# Patient Record
Sex: MALE | Race: WHITE | NOT HISPANIC OR LATINO | Employment: FULL TIME | URBAN - METROPOLITAN AREA
[De-identification: names, ages, dates, MRNs, and addresses within clinical notes are randomized per-mention and may not be internally consistent; named-entity substitution may affect disease eponyms.]

---

## 2020-01-16 ENCOUNTER — TRANSCRIBE ORDERS (OUTPATIENT)
Dept: ADMINISTRATIVE | Facility: HOSPITAL | Age: 40
End: 2020-01-16

## 2020-01-16 DIAGNOSIS — M79.672 FOOT PAIN, BILATERAL: Primary | ICD-10-CM

## 2020-01-16 DIAGNOSIS — M79.671 FOOT PAIN, BILATERAL: Primary | ICD-10-CM

## 2020-02-06 ENCOUNTER — HOSPITAL ENCOUNTER (OUTPATIENT)
Dept: INFUSION THERAPY | Facility: HOSPITAL | Age: 40
Discharge: HOME | End: 2020-02-06
Admitting: PSYCHIATRY & NEUROLOGY

## 2020-02-06 DIAGNOSIS — M79.672 FOOT PAIN, BILATERAL: ICD-10-CM

## 2020-02-06 DIAGNOSIS — M79.671 FOOT PAIN, BILATERAL: ICD-10-CM

## 2020-02-06 PROCEDURE — 95909 NRV CNDJ TST 5-6 STUDIES: CPT

## 2020-02-06 PROCEDURE — 95886 MUSC TEST DONE W/N TEST COMP: CPT

## 2020-02-06 PROCEDURE — 95886 MUSC TEST DONE W/N TEST COMP: CPT | Performed by: PSYCHIATRY & NEUROLOGY

## 2020-02-06 PROCEDURE — 95909 NRV CNDJ TST 5-6 STUDIES: CPT | Performed by: PSYCHIATRY & NEUROLOGY

## 2020-02-06 NOTE — PROCEDURES
EMG REPORT    Indication: Burning and numbness of both lower extremities    Findings: Bilateral peroneal motor study showed normal distal latencies velocities and amplitudes.  Lateral tibial motor study showed normal distal latencies amplitudes and F-wave latencies.  Left sural sensory study showed normal latency and amplitude.    Concentric needle EMG of bilateral vastus lateralis, vastus medialis, anterior tibialis, peroneus and gastrocnemius muscles showed no abnormality.    Impression: Normal EMG and nerve conduction studies of both lower extremities.       Alden Jara M.D.

## 2021-07-20 ENCOUNTER — OFFICE VISIT (OUTPATIENT)
Dept: NEUROLOGY | Facility: CLINIC | Age: 41
End: 2021-07-20

## 2021-07-20 ENCOUNTER — LAB (OUTPATIENT)
Dept: LAB | Facility: HOSPITAL | Age: 41
End: 2021-07-20

## 2021-07-20 VITALS
SYSTOLIC BLOOD PRESSURE: 134 MMHG | WEIGHT: 243 LBS | OXYGEN SATURATION: 98 % | HEART RATE: 88 BPM | DIASTOLIC BLOOD PRESSURE: 94 MMHG

## 2021-07-20 DIAGNOSIS — G60.9 IDIOPATHIC PERIPHERAL NEUROPATHY: Primary | ICD-10-CM

## 2021-07-20 DIAGNOSIS — G60.9 IDIOPATHIC PERIPHERAL NEUROPATHY: ICD-10-CM

## 2021-07-20 PROBLEM — R20.2 PARESTHESIA: Status: ACTIVE | Noted: 2020-10-01

## 2021-07-20 PROBLEM — I10 HYPERTENSION: Status: ACTIVE | Noted: 2021-06-08

## 2021-07-20 PROBLEM — L40.9 PSORIASIS: Status: ACTIVE | Noted: 2017-02-03

## 2021-07-20 PROBLEM — J30.2 SEASONAL ALLERGIES: Status: ACTIVE | Noted: 2020-02-06

## 2021-07-20 PROBLEM — J30.9 ALLERGIC RHINITIS: Status: ACTIVE | Noted: 2018-02-12

## 2021-07-20 PROBLEM — E11.9 TYPE 2 DIABETES MELLITUS: Status: ACTIVE | Noted: 2021-06-08

## 2021-07-20 PROBLEM — D17.9 LIPOMA: Status: ACTIVE | Noted: 2018-01-30

## 2021-07-20 PROBLEM — K42.9 UMBILICAL HERNIA: Status: ACTIVE | Noted: 2018-01-30

## 2021-07-20 PROBLEM — M54.16 LUMBAR RADICULOPATHY: Status: ACTIVE | Noted: 2018-01-30

## 2021-07-20 PROBLEM — M75.52 SUBACROMIAL BURSITIS OF LEFT SHOULDER JOINT: Status: ACTIVE | Noted: 2020-10-15

## 2021-07-20 PROBLEM — J01.90 ACUTE SINUSITIS: Status: ACTIVE | Noted: 2018-02-23

## 2021-07-20 PROBLEM — M67.919 DISORDER OF ROTATOR CUFF: Status: ACTIVE | Noted: 2020-10-15

## 2021-07-20 PROBLEM — S43.439A INJURY OF SUPERIOR GLENOID LABRUM OF SHOULDER JOINT: Status: ACTIVE | Noted: 2020-10-06

## 2021-07-20 PROBLEM — R73.09 OTHER ABNORMAL GLUCOSE: Status: ACTIVE | Noted: 2018-08-28

## 2021-07-20 PROBLEM — M25.519 SHOULDER PAIN: Status: ACTIVE | Noted: 2020-09-18

## 2021-07-20 PROBLEM — M19.90 ARTHRITIS: Status: ACTIVE | Noted: 2021-06-08

## 2021-07-20 PROBLEM — E78.5 HYPERLIPIDEMIA: Status: ACTIVE | Noted: 2021-06-08

## 2021-07-20 PROBLEM — U07.1 COVID-19: Status: ACTIVE | Noted: 2020-11-01

## 2021-07-20 PROBLEM — M54.50 LOW BACK PAIN: Status: ACTIVE | Noted: 2020-10-01

## 2021-07-20 PROBLEM — M19.019 DISORDER OF ACROMIOCLAVICULAR JOINT: Status: ACTIVE | Noted: 2020-10-15

## 2021-07-20 PROBLEM — L25.9 CONTACT DERMATITIS: Status: ACTIVE | Noted: 2017-03-14

## 2021-07-20 PROBLEM — R26.9 ABNORMAL GAIT: Status: ACTIVE | Noted: 2020-10-01

## 2021-07-20 PROBLEM — E66.9 OBESITY: Status: ACTIVE | Noted: 2018-08-28

## 2021-07-20 PROBLEM — Z23 IMMUNIZATION DUE: Status: ACTIVE | Noted: 2020-09-18

## 2021-07-20 LAB
ERYTHROCYTE [SEDIMENTATION RATE] IN BLOOD: 13 MM/HR (ref 0–15)
FOLATE SERPL-MCNC: 10.8 NG/ML (ref 4.78–24.2)
RPR SER QL: NORMAL
T4 FREE SERPL-MCNC: 1.08 NG/DL (ref 0.93–1.7)
TSH SERPL DL<=0.05 MIU/L-ACNC: 2.29 UIU/ML (ref 0.27–4.2)
VIT B12 BLD-MCNC: 818 PG/ML (ref 211–946)

## 2021-07-20 PROCEDURE — 83825 ASSAY OF MERCURY: CPT | Performed by: PSYCHIATRY & NEUROLOGY

## 2021-07-20 PROCEDURE — 84165 PROTEIN E-PHORESIS SERUM: CPT | Performed by: PSYCHIATRY & NEUROLOGY

## 2021-07-20 PROCEDURE — 82607 VITAMIN B-12: CPT | Performed by: PSYCHIATRY & NEUROLOGY

## 2021-07-20 PROCEDURE — 86592 SYPHILIS TEST NON-TREP QUAL: CPT | Performed by: PSYCHIATRY & NEUROLOGY

## 2021-07-20 PROCEDURE — 82784 ASSAY IGA/IGD/IGG/IGM EACH: CPT

## 2021-07-20 PROCEDURE — 83655 ASSAY OF LEAD: CPT | Performed by: PSYCHIATRY & NEUROLOGY

## 2021-07-20 PROCEDURE — 84439 ASSAY OF FREE THYROXINE: CPT | Performed by: PSYCHIATRY & NEUROLOGY

## 2021-07-20 PROCEDURE — 82595 ASSAY OF CRYOGLOBULIN: CPT

## 2021-07-20 PROCEDURE — 86334 IMMUNOFIX E-PHORESIS SERUM: CPT

## 2021-07-20 PROCEDURE — 84155 ASSAY OF PROTEIN SERUM: CPT | Performed by: PSYCHIATRY & NEUROLOGY

## 2021-07-20 PROCEDURE — 99215 OFFICE O/P EST HI 40 MIN: CPT | Performed by: PSYCHIATRY & NEUROLOGY

## 2021-07-20 PROCEDURE — 85652 RBC SED RATE AUTOMATED: CPT | Performed by: PSYCHIATRY & NEUROLOGY

## 2021-07-20 PROCEDURE — 82175 ASSAY OF ARSENIC: CPT | Performed by: PSYCHIATRY & NEUROLOGY

## 2021-07-20 PROCEDURE — 82746 ASSAY OF FOLIC ACID SERUM: CPT | Performed by: PSYCHIATRY & NEUROLOGY

## 2021-07-20 PROCEDURE — 82525 ASSAY OF COPPER: CPT

## 2021-07-20 PROCEDURE — 36415 COLL VENOUS BLD VENIPUNCTURE: CPT | Performed by: PSYCHIATRY & NEUROLOGY

## 2021-07-20 PROCEDURE — 84443 ASSAY THYROID STIM HORMONE: CPT | Performed by: PSYCHIATRY & NEUROLOGY

## 2021-07-20 RX ORDER — GABAPENTIN 100 MG/1
100 CAPSULE ORAL 3 TIMES DAILY
Qty: 90 CAPSULE | Refills: 6 | Status: SHIPPED | OUTPATIENT
Start: 2021-07-20

## 2021-07-20 RX ORDER — ESOMEPRAZOLE MAGNESIUM 40 MG/1
CAPSULE, DELAYED RELEASE ORAL DAILY PRN
COMMUNITY
Start: 2021-06-02

## 2021-07-20 RX ORDER — ACYCLOVIR 400 MG/1
400 TABLET ORAL DAILY PRN
COMMUNITY
Start: 2021-06-08

## 2021-07-20 RX ORDER — LOSARTAN POTASSIUM 25 MG/1
25 TABLET ORAL DAILY
COMMUNITY
Start: 2021-07-08

## 2021-07-20 RX ORDER — ETANERCEPT 50 MG/ML
SOLUTION SUBCUTANEOUS
COMMUNITY

## 2021-07-20 RX ORDER — ATORVASTATIN CALCIUM 10 MG/1
10 TABLET, FILM COATED ORAL DAILY
COMMUNITY
Start: 2021-07-08

## 2021-07-20 RX ORDER — SEMAGLUTIDE 1.34 MG/ML
INJECTION, SOLUTION SUBCUTANEOUS
COMMUNITY
Start: 2021-07-19

## 2021-07-20 NOTE — PROGRESS NOTES
CC: Bilateral foot pain    HPI:  Anthony Archer is a  41 y.o.  left-handed white male who was sent for neurologic consultation regarding predominantly painful symptoms in both feet.  The patient had been sent by Kathy Hubbard for an EMG done by Dr. Jara 2/2020.  (Therefore this is considered a follow-up although I have never seen the patient).  This was a lower extremity study and reportedly showed normal peroneal motor conductions and normal tibial motor latencies and amplitudes.  The F waves were prolonged.  A sural sensory was reported normal.  Needle exam of 5 muscles in each leg was normal.  No intrinsic foot muscles were studied.    The patient has history of rheumatoid arthritis and is treated with Enbrel by rheumatology.  He had been seen by podiatry apparently who thought he had plantar fasciitis.  Cortisone shots in the feet did not seem to help but he was treated at some point with steroids for rheumatoid arthritis and it did seem to help his foot pain significantly.  The effect was however short-lived.  He has developed diabetes about a year and a half ago.  He just had a left shoulder operation.    Symptomatology is basically dominantly pain in the soles of both feet with lightning pains and stabbing pains in the midfoot which radiate into the toes.  He has burning on the soles of the feet and symptoms are worse late such as upon returning from work as well as at night.  He describes stinging when he is on his feet.  He denies symptoms in his hands.  There is not significant swelling described.    He states he was previously on gabapentin which caused memory loss.  He was never on Lyrica.    The patient states he was sent to the [facility] and saw female neurologist there who he does not recall the name.  She apparently wanted to repeat his EMG at that time it was only about 3 months after he had the first 1 and he declined it.  No additional work-up was obtained.      History  reviewed. No pertinent past medical history.      Past Surgical History:   Procedure Laterality Date   • ARTHROSCOPY SHOULDER / OPEN SHOULDER             Current Outpatient Medications:   •  acyclovir (ZOVIRAX) 400 MG tablet, Take 400 mg by mouth Daily As Needed., Disp: , Rfl:   •  atorvastatin (LIPITOR) 10 MG tablet, Take 10 mg by mouth Daily., Disp: , Rfl:   •  esomeprazole (nexIUM) 40 MG capsule, Daily As Needed., Disp: , Rfl:   •  Etanercept (Enbrel Mini) 50 MG/ML solution cartridge, Inject  under the skin into the appropriate area as directed. Weekly; has been out for due to insurance coverage., Disp: , Rfl:   •  losartan (COZAAR) 25 MG tablet, Take 25 mg by mouth Daily., Disp: , Rfl:   •  Ozempic, 0.25 or 0.5 MG/DOSE, 2 MG/1.5ML solution pen-injector, , Disp: , Rfl:   •  gabapentin (NEURONTIN) 100 MG capsule, Take 1 capsule by mouth 3 (Three) Times a Day., Disp: 90 capsule, Rfl: 6      Family History   Problem Relation Age of Onset   • Dementia Maternal Grandfather          Social History     Socioeconomic History   • Marital status:      Spouse name: Not on file   • Number of children: Not on file   • Years of education: Not on file   • Highest education level: Not on file   Tobacco Use   • Smoking status: Former Smoker     Quit date:      Years since quittin.5   • Smokeless tobacco: Never Used   Substance and Sexual Activity   • Alcohol use: Yes     Comment: couple drinks a week   • Drug use: Yes     Types: Marijuana         No Known Allergies      Pain Scale: Variable.  4/10 currently        ROS:  Review of Systems   Constitutional: Negative for activity change, appetite change and unexpected weight change.   HENT: Negative for facial swelling, trouble swallowing and voice change.    Eyes: Negative for photophobia, pain and visual disturbance.   Respiratory: Negative for chest tightness, shortness of breath and wheezing.    Cardiovascular: Negative for chest pain, palpitations and leg  swelling.   Gastrointestinal: Negative for abdominal pain, nausea and vomiting.   Endocrine: Negative for cold intolerance and heat intolerance.   Musculoskeletal: Positive for back pain. Negative for arthralgias, gait problem, joint swelling, myalgias, neck pain and neck stiffness.   Neurological: Negative for dizziness, tremors, seizures, syncope, facial asymmetry, speech difficulty, weakness, light-headedness, numbness and headaches.   Hematological: Does not bruise/bleed easily.   Psychiatric/Behavioral: Negative for agitation, behavioral problems, confusion, decreased concentration, dysphoric mood, hallucinations, self-injury, sleep disturbance and suicidal ideas. The patient is not nervous/anxious and is not hyperactive.          I have reviewed and agree with the above ROS completed by the medical assistant.      Physical Exam:  Vitals:    07/20/21 1100   BP: 134/94   BP Location: Left arm   Patient Position: Sitting   Cuff Size: Adult   Pulse: 88   SpO2: 98%   Weight: 110 kg (243 lb)     Orthostatic BP:    There is no height or weight on file to calculate BMI.    Physical Exam  General: Overweight white male no acute distress  HEENT: Normocephalic no evidence of trauma.  Discs flat.  No AV nicking.  Throat negative  Neck: Supple.  No Lhermitte sign.  No cervical bruits.  Radial pulses strong and simultaneous.  No thyromegaly.  Heart: Regular rate and rhythm no murmurs.  No pedal edema.  Extremities: No edema.  No swelling.      Neurological Exam:   Mental Status: Awake, alert, oriented to person, place and time.  Conversant without evidence of an affective disorder, thought disorder, delusions or hallucinations.  Attention span and concentration are normal.  HCF: No aphasia, apraxia or dysarthria.  Recent and remote memory intact.  Knowledge of recent events intact.  CN: I:   II: Visual fields full without left inattention   III, IV, VI: Eye movements intact without nystagmus or ptosis.  Pupils equal round  and reactive to light.   V,VII: Light touch and pinprick intact all 3 divisions of V.  Facial muscles symmetrical.   VIII: Hearing intact to finger rub   IX,X: Soft palate elevates symmetrically   XI: Sternomastoid and trapezius are strong.   XII: Tongue midline without atrophy or fasciculations  Motor: Normal tone and bulk in the upper and lower extremities   Power testing: Minor weakness of the interossei and abductor pollicis brevis muscles bilaterally.  All other muscles in the upper extremities are strong.  In the lower extremities there was questionable weakness of tortoise or flexion with remaining muscles tested in both legs being strong.  Reflexes: Upper extremities: +2 diffusely        Lower extremities: +2 diffusely        Toe signs: Downgoing bilaterally  Sensory: Light touch: Diffusely intact in the upper extremities.  In the lower extremities there is some hypersensitivity in the feet        Pinprick: Normal in the upper extremities.  Lower extremities some hypersensitivity in the feet except reduced pinprick on the soles        Vibration: Intact at the ankles        Position: Intact at the great toes    Cerebellar: Finger-to-nose: Normal           Rapid movement: Normal           Heel-to-shin: Normal  Gait and Station: Casual walk toe walk heel walk tandem walk appear normal.  No Romberg no drift    Results:      No results found for: GLUCOSE, BUN, CREATININE, EGFRIFNONA, EGFRIFAFRI, BCR, CO2, CALCIUM, PROTENTOTREF, ALBUMIN, LABIL2, BILIRUBIN, AST, ALT    No results found for: WBC, HGB, HCT, MCV, PLT      .No results found for: RPR      No results found for: TSH, U2OBJJG, I4TICPZ, THYROIDAB      No results found for: PWENRITO13      No results found for: FOLATE      No results found for: HGBA1C      No results found for: GLUCOSE, BUN, CREATININE, EGFRIFNONA, EGFRIFAFRI, BCR, K, CO2, CALCIUM, PROTENTOTREF, ALBUMIN, LABIL2, BILIRUBIN, AST, ALT      No results found for: WBC, HGB, HCT, MCV,  PLT          Assessment:   1.  Bilateral foot pain predominantly on the soles with weightbearing being worse-some of the symptoms sound like plantar fasciitis some of them sound like small fiber neuropathy.  2.  Chronic low back pain        Plan:  1.  Repeat EMG both lower extremities.  2.  Labs including sed rate, RPR, B12 and folate, thyroid functions, SPE/IEP, cryoglobulins, heavy metal screen, copper level  3.  To follow-up at time of his EMG.  4.  Trial of gabapentin trying low-dose 100 mg up to 3 times daily.  I asked him to call me if he has untoward side effects            Time: 45 minutes              Dictated utilizing Dragon dictation.

## 2021-07-21 LAB
ALBUMIN SERPL ELPH-MCNC: 3.9 G/DL (ref 2.9–4.4)
ALBUMIN/GLOB SERPL: 1.1 {RATIO} (ref 0.7–1.7)
ALPHA1 GLOB SERPL ELPH-MCNC: 0.3 G/DL (ref 0–0.4)
ALPHA2 GLOB SERPL ELPH-MCNC: 0.8 G/DL (ref 0.4–1)
B-GLOBULIN SERPL ELPH-MCNC: 1.1 G/DL (ref 0.7–1.3)
GAMMA GLOB SERPL ELPH-MCNC: 1.5 G/DL (ref 0.4–1.8)
GLOBULIN SER CALC-MCNC: 3.6 G/DL (ref 2.2–3.9)
IGA SERPL-MCNC: <5 MG/DL (ref 90–386)
IGG SERPL-MCNC: 1370 MG/DL (ref 603–1613)
IGM SERPL-MCNC: 60 MG/DL (ref 20–172)
LABORATORY COMMENT REPORT: NORMAL
M PROTEIN SERPL ELPH-MCNC: NORMAL G/DL
PROT PATTERN SERPL ELPH-IMP: NORMAL
PROT PATTERN SERPL IFE-IMP: ABNORMAL
PROT SERPL-MCNC: 7.5 G/DL (ref 6–8.5)

## 2021-07-22 LAB
ARSENIC BLD-MCNC: 8 UG/L (ref 2–23)
COPPER SERPL-MCNC: 130 UG/DL (ref 69–132)
LEAD BLDV-MCNC: <1 UG/DL (ref 0–4)
MERCURY BLD-MCNC: <1 UG/L (ref 0–14.9)

## 2021-07-26 LAB — CRYOGLOB SER QL 1D COLD INC: NORMAL

## 2021-11-18 ENCOUNTER — HOSPITAL ENCOUNTER (OUTPATIENT)
Dept: INFUSION THERAPY | Facility: HOSPITAL | Age: 41
Discharge: HOME | End: 2021-11-18
Admitting: PSYCHIATRY & NEUROLOGY

## 2021-11-18 DIAGNOSIS — M79.671 BILATERAL FOOT PAIN: Primary | ICD-10-CM

## 2021-11-18 DIAGNOSIS — G60.9 IDIOPATHIC PERIPHERAL NEUROPATHY: ICD-10-CM

## 2021-11-18 DIAGNOSIS — M79.672 BILATERAL FOOT PAIN: Primary | ICD-10-CM

## 2021-11-18 PROCEDURE — 95886 MUSC TEST DONE W/N TEST COMP: CPT | Performed by: PSYCHIATRY & NEUROLOGY

## 2021-11-18 PROCEDURE — 95910 NRV CNDJ TEST 7-8 STUDIES: CPT

## 2021-11-18 PROCEDURE — 95886 MUSC TEST DONE W/N TEST COMP: CPT

## 2021-11-18 PROCEDURE — 95910 NRV CNDJ TEST 7-8 STUDIES: CPT | Performed by: PSYCHIATRY & NEUROLOGY

## 2021-11-18 NOTE — PROCEDURES
EMG and Nerve Conduction Studies    I.      Instrument used: Neuromax 1002  II.     Please see data sheets for tabular summary of NCS and details on methods, temperatures and lab standards.   III.    EMG muscles tested for upper extremity studies include the deltoid, biceps, triceps, pronator teres, extensor digitorum communis, first dorsal interosseous and abductor pollicis brevis.    IV.   EMG muscles tested for lower extremity studies include the vastus lateralis, tibialis anterior, peroneus longus, medial gastrocnemius and extensor digitorum brevis.    V.    Additional muscles tested as needed.  Paraspinal muscles tested as needed.   VI.   Please see data sheets for tabular summary of EMG findings.   VII. The complete report includes the data sheets.      Indication: Pain tingling and burning soles of feet  History: 41-year-old male with pain with tingling and burning on the soles of the feet      Ht: Not reported  Wt: 110 kg  HbA1C: No results found for: HGBA1C  TSH:   Lab Results   Component Value Date    TSH 2.290 07/20/2021       Technical summary:  Nerve conduction studies were obtained in both lower extremities.  Skin temperatures were difficult to maintain above 32 °C.  The bottoms of the feet were warmed and the temperatures were about 31 °C.  Temperature correction was not needed.  Needle examination on selected muscles in both legs was obtained.    Results:  1.  Normal right sural sensory distal latency and amplitude.  2.  Normal right superficial peroneal sensory distal latency and amplitude.  3.  Normal medial mixed orthodromic plantar distal latencies with low amplitudes of 4.7 µV on the right and 2.7 µV on the left.  4.  Normal right peroneal motor conduction velocities, distal latency and amplitudes.  5.  Normal tibial motor conduction velocities, distal latencies along the medial and lateral plantar motor nerves and amplitudes.  6.  Needle examination of selected muscles in both legs showed  fibrillations and/or positive sharp waves in the abductor digiti quinti muscles bilaterally.  Recruitment was very poor with complaint of pain.  All other muscles tested showed normal insertional activities with normal motor units and full recruitment patterns other than in the extensor digitorum brevis muscles showing some reduced recruitment with complaints of pain.  Lumbar paraspinals at L5 showed no abnormality on either side.    Impression:  Abnormal study showing low amplitude medial orthodromic mixed plantar amplitudes with denervation changes in both abductor digiti quinti muscles consistent with axonal plantar neuropathies bilaterally.  No other findings of a more diffuse polyneuropathy were seen.  There was no evidence of a lumbosacral radiculopathy on either side by the study.  Study results were discussed with the patient.    Aidan Espinal M.D.        Addendum: Review of labs from the office demonstrates a agammaglobulinemia IgA.  No MGUS.  Cryoglobulins negative.  B12 level 818, folate 10.8, TSH 2.29 and free T4 1.08.  Copper level 130.  Heavy metal screen was negative.  Sed rate 13 and RPR nonreactive.    He will be sent for a foot and ankle orthopedic consult to Dr. Keith and he will see ALFRED Roca in our office in follow-up for further consideration of skin biopsy for small fiber neuropathy.  GNS      Dictated utilizing Dragon dictation.

## 2021-12-16 ENCOUNTER — OFFICE VISIT (OUTPATIENT)
Dept: ORTHOPEDIC SURGERY | Facility: CLINIC | Age: 41
End: 2021-12-16

## 2021-12-16 VITALS — HEIGHT: 72 IN | WEIGHT: 240 LBS | BODY MASS INDEX: 32.51 KG/M2 | TEMPERATURE: 97.1 F

## 2021-12-16 DIAGNOSIS — M79.671 BILATERAL FOOT PAIN: ICD-10-CM

## 2021-12-16 DIAGNOSIS — M79.672 BILATERAL FOOT PAIN: ICD-10-CM

## 2021-12-16 DIAGNOSIS — G57.52 TTS (TARSAL TUNNEL SYNDROME), LEFT: ICD-10-CM

## 2021-12-16 DIAGNOSIS — G57.90 NEURITIS OF FOOT, UNSPECIFIED LATERALITY: Primary | ICD-10-CM

## 2021-12-16 DIAGNOSIS — G57.51 TTS (TARSAL TUNNEL SYNDROME), RIGHT: ICD-10-CM

## 2021-12-16 PROCEDURE — 73630 X-RAY EXAM OF FOOT: CPT | Performed by: ORTHOPAEDIC SURGERY

## 2021-12-16 PROCEDURE — 99244 OFF/OP CNSLTJ NEW/EST MOD 40: CPT | Performed by: ORTHOPAEDIC SURGERY

## 2021-12-16 RX ORDER — ICOSAPENT ETHYL 1000 MG/1
CAPSULE ORAL
COMMUNITY
Start: 2021-12-02

## 2021-12-17 NOTE — PROGRESS NOTES
"   New Patient Complaint      Patient: Anthnoy Archer  YOB: 1980 41 y.o. male  Medical Record Number: [id number]    Chief Complaints: My feet hurt and burn    History of Present Illness:     Patient reports a 3 to 4-year history of pain and burning in the bottoms of both feet.  He reports it is moderate at 6 out of 10 shooting burning pain worse with standing especially with prolonged standing at work but also some at rest and reported as an intermittent \"lightening bolt shooting to the plantar aspect of his foot.  It bothers him when his feet are pointed upward more when he is sleeping or reclining and is relieved some when he lays on his side.    He had been seen by another foot specialist at [facility] 3 to 4 years ago and they tried some injections for plantar fasciitis.    He ended up seeing rheumatology was found to have rheumatoid arthritis and had been given an oral steroid for a while and during that time his symptoms in his feet were relieved.    About 3 years ago he was diagnosed with diabetes.    He has been on gabapentin without relief.    Ended up seeing Dr. Aidan Espinal of neurology for further evaluation and an EMG nerve conduction study that will be reviewed below and discussed with Dr. Espinal.    HPI    Allergies: No Known Allergies    Medications:   Current Outpatient Medications on File Prior to Visit   Medication Sig   • acyclovir (ZOVIRAX) 400 MG tablet Take 400 mg by mouth Daily As Needed.   • atorvastatin (LIPITOR) 10 MG tablet Take 10 mg by mouth Daily.   • esomeprazole (nexIUM) 40 MG capsule Daily As Needed.   • Etanercept (Enbrel Mini) 50 MG/ML solution cartridge Inject  under the skin into the appropriate area as directed. Weekly; has been out for due to insurance coverage.   • losartan (COZAAR) 25 MG tablet Take 25 mg by mouth Daily.   • Ozempic, 0.25 or 0.5 MG/DOSE, 2 MG/1.5ML solution pen-injector    • gabapentin (NEURONTIN) 100 MG capsule Take 1 capsule " "by mouth 3 (Three) Times a Day.   • icosapent ethyl (VASCEPA) 1 g capsule capsule TAKE 2 CAPSULES BY MOUTH TWO TIMES A DAY WITH MEALS. FOR CV RISK REDUCTION.     No current facility-administered medications on file prior to visit.       No past medical history on file.  Past Surgical History:   Procedure Laterality Date   • ARTHROSCOPY SHOULDER / OPEN SHOULDER       Social History     Occupational History   • Not on file   Tobacco Use   • Smoking status: Former Smoker     Quit date:      Years since quittin.9   • Smokeless tobacco: Never Used   Substance and Sexual Activity   • Alcohol use: Yes     Comment: couple drinks a week   • Drug use: Yes     Types: Marijuana   • Sexual activity: Not on file      Social History     Social History Narrative   • Not on file     Family History   Problem Relation Age of Onset   • Dementia Maternal Grandfather        Review of Systems: 14 point review of systems performed, positive pertinent findings identified in HPI. All remaining systems negative     Review of Systems      Physical Exam:   Vitals:    21 1509   Temp: 97.1 °F (36.2 °C)   Weight: 109 kg (240 lb)   Height: 182.9 cm (72\")   PainSc:   5     Physical Exam   Constitutional: pleasant, well developed   Eyes: sclera non icteric  Hearing : adequate for exam  Cardiovascular: palpable pulses in bilaeral feet, bilateral calves/ thighs NT without sign of DVT  Respiratoy: breathing unlabored   Neurological: grossly sensate to LT throughout bilateral LEs  Psychiatric: oriented with normal mood and affect.   Lymphatic: No palpable popliteal lymphadenopathy bilateral LEs  Skin: intact throughout bilateral legs/feet  Musculoskeletal: On exam he has a nonantalgic gait.  There was no warmth erythema to either foot had good range of motion of the ankles and no callosities about the feet.  He was able to flex and extend his toes well and had grossly normal motor strength on dorsiflexion and plantarflexion of the ankles.  " I was able to elicit a Tinel's with some questionable fullness over the tarsal tunnel bilaterally.  Physical Exam  Ortho Exam    Radiology: 3 views of both feet ordered evaluate pain reviewed and no prior x-rays available for comparison.  Patient does have metatarsus abductus with some mild degenerative change at the tarsometatarsal joints especially the second and third.  No obvious fractures or other gross malalignment.    EMG nerve conduction study report was reviewed and subsequently discussed with Dr. Aidan Espinal of neurology.    This was interpreted as abnormal study showing low amplitude medial orthodromic mixed plantar amplitudes with denervation changes in both abductor digiti quinti muscles consistent with axonal plantar neuropathies bilaterally.  There were no other findings of more diffuse polyneuropathy and no evidence of lumbosacral radiculopathy.      Assessment/Plan: Bilateral foot pain and burning with possible tarsal tunnel syndrome.    I discussed this with Dr. Espinal and he explained to me that usually with tarsal tunnel he would see slowing of conductivity with increased latency but not low amplitude which indicates fewer nerves in the bundle as demonstrated and there was the denervation changes as outlined above.  This did not seem to be truly indicative of a pure polyneuropathy and was not exactly classic for tarsal tunnel either.    Reviewed with the patient that obviously this is a complex situation mainly involving the nerves and if it is something coming from the tarsal tunnel which seems to indicate on exam the only thing I could potentially help him with his if there is a space-occupying lesion within the tarsal tunnels as I have not had good results with tarsal tunnel release without space-occupying lesion.    As discussed with Dr. Espinal could be something for the tarsal tunnel and he was also concerned there could be something more in the midfoot as well causing nerve  impingement.    What we are going to do is get MRIs of both ankles to include the midfoot to see if there is any nerve lesions around the tarsal tunnel or at the midfoot and depending on what we see there we will tailor treatment accordingly and may end up needing to send him to see Dr. Fletcher who is a hand specialist but also does quite a bit of peripheral nerve work that I and Dr. Espinal have both used for this.    Patient voiced clear understanding of the treatment plan and that I may not have much further to do for him but we will see with the MRIs show and see him back and determine treatment going forward.

## 2022-01-21 ENCOUNTER — HOSPITAL ENCOUNTER (OUTPATIENT)
Dept: MRI IMAGING | Facility: HOSPITAL | Age: 42
Discharge: HOME | End: 2022-01-21

## 2022-02-18 ENCOUNTER — HOSPITAL ENCOUNTER (OUTPATIENT)
Dept: MRI IMAGING | Facility: HOSPITAL | Age: 42
Discharge: HOME | End: 2022-02-18

## 2022-02-18 DIAGNOSIS — G57.52 TTS (TARSAL TUNNEL SYNDROME), LEFT: ICD-10-CM

## 2022-02-18 DIAGNOSIS — G57.90 NEURITIS OF FOOT, UNSPECIFIED LATERALITY: ICD-10-CM

## 2022-02-18 DIAGNOSIS — G57.51 TTS (TARSAL TUNNEL SYNDROME), RIGHT: ICD-10-CM

## 2022-02-18 PROCEDURE — 73721 MRI JNT OF LWR EXTRE W/O DYE: CPT

## 2022-02-22 ENCOUNTER — TELEPHONE (OUTPATIENT)
Dept: ORTHOPEDIC SURGERY | Facility: CLINIC | Age: 42
End: 2022-02-22

## 2022-02-22 NOTE — TELEPHONE ENCOUNTER
Left message on patient's listed numbers to discuss test results and about scheduling follow-up appointment.  Requested call back

## 2022-03-04 ENCOUNTER — TELEPHONE (OUTPATIENT)
Dept: ORTHOPEDIC SURGERY | Facility: CLINIC | Age: 42
End: 2022-03-04

## 2022-03-04 DIAGNOSIS — G57.90 NEURITIS OF FOOT, UNSPECIFIED LATERALITY: Primary | ICD-10-CM

## 2022-03-04 DIAGNOSIS — G57.51 TTS (TARSAL TUNNEL SYNDROME), RIGHT: ICD-10-CM

## 2022-03-04 DIAGNOSIS — G57.52 TTS (TARSAL TUNNEL SYNDROME), LEFT: ICD-10-CM

## 2022-03-04 NOTE — TELEPHONE ENCOUNTER
Spoke with patient and reviewed with him the MRIs of his ankle did not show any obvious mass around the tarsal tunnel.  Reviewed with him that given the lack of any mass around the tarsal tunnel in conjunction with his abnormal EMG nerve conduction studies that I would recommend that he see Dr. Fletcher for further evaluation treatment going forward as I have not had good results with surgery for this without mass in the tarsal tunnel.  He voiced a clear understanding of this and appreciated my discussing this with him.    He is going to obtain copies of his MRI disks prior to his appointment with Dr. Fletcher and we will mail him a copy of his x-rays that he had done in our office as well as a copy of his EMG nerve conduction study and MRI results.    He was given Dr. Fletcher's name and number to call about scheduling an appointment and I placed a referral in epic.    He appreciated the call

## 2022-03-29 ENCOUNTER — TELEPHONE (OUTPATIENT)
Dept: ORTHOPEDIC SURGERY | Facility: CLINIC | Age: 42
End: 2022-03-29

## 2022-03-29 NOTE — TELEPHONE ENCOUNTER
I spoke with patient and let him know that I did speak with  who said that he would be willing to see him to see if there is anything further he could offer him.  With his permission I will give Dr. Fletcher his name phone number and date of birth to contact about making an appointment and also gave patient 's office number.  Dr. Fletcher said that he will have access to the nerve conduction study and MRI reports that are available in epic.